# Patient Record
Sex: MALE | Race: WHITE | Employment: UNEMPLOYED | URBAN - METROPOLITAN AREA
[De-identification: names, ages, dates, MRNs, and addresses within clinical notes are randomized per-mention and may not be internally consistent; named-entity substitution may affect disease eponyms.]

---

## 2023-05-12 ENCOUNTER — HOSPITAL ENCOUNTER (INPATIENT)
Age: 63
LOS: 4 days | Discharge: HOME | DRG: 066 | End: 2023-05-16
Attending: STUDENT IN AN ORGANIZED HEALTH CARE EDUCATION/TRAINING PROGRAM | Admitting: FAMILY MEDICINE
Payer: MEDICARE

## 2023-05-12 ENCOUNTER — APPOINTMENT (OUTPATIENT)
Dept: CT IMAGING | Age: 63
DRG: 066 | End: 2023-05-12
Attending: STUDENT IN AN ORGANIZED HEALTH CARE EDUCATION/TRAINING PROGRAM
Payer: MEDICARE

## 2023-05-12 PROBLEM — I60.7: Status: ACTIVE | Noted: 2023-05-12

## 2023-05-12 PROCEDURE — 93005 ELECTROCARDIOGRAM TRACING: CPT | Performed by: STUDENT IN AN ORGANIZED HEALTH CARE EDUCATION/TRAINING PROGRAM

## 2023-05-12 PROCEDURE — 6360000002 HC RX W HCPCS: Performed by: NEUROLOGICAL SURGERY

## 2023-05-12 PROCEDURE — 2000000000 HC ICU R&B

## 2023-05-12 PROCEDURE — 6370000000 HC RX 637 (ALT 250 FOR IP): Performed by: NEUROLOGICAL SURGERY

## 2023-05-12 PROCEDURE — 70450 CT HEAD/BRAIN W/O DYE: CPT

## 2023-05-12 RX ORDER — LEVETIRACETAM 500 MG/1
500 TABLET ORAL 2 TIMES DAILY
Status: DISCONTINUED | OUTPATIENT
Start: 2023-05-12 | End: 2023-05-16 | Stop reason: HOSPADM

## 2023-05-12 RX ORDER — ASPIRIN 81 MG/1
81 TABLET, CHEWABLE ORAL DAILY
COMMUNITY

## 2023-05-12 RX ORDER — ACETAMINOPHEN 325 MG/1
650 TABLET ORAL EVERY 4 HOURS PRN
Status: DISCONTINUED | OUTPATIENT
Start: 2023-05-12 | End: 2023-05-16 | Stop reason: HOSPADM

## 2023-05-12 RX ORDER — ONDANSETRON 4 MG/1
4 TABLET, ORALLY DISINTEGRATING ORAL EVERY 8 HOURS PRN
Status: DISCONTINUED | OUTPATIENT
Start: 2023-05-12 | End: 2023-05-16 | Stop reason: HOSPADM

## 2023-05-12 RX ORDER — AMLODIPINE BESYLATE 5 MG/1
5 TABLET ORAL DAILY
COMMUNITY

## 2023-05-12 RX ORDER — LABETALOL HYDROCHLORIDE 5 MG/ML
10 INJECTION, SOLUTION INTRAVENOUS EVERY 10 MIN PRN
Status: DISCONTINUED | OUTPATIENT
Start: 2023-05-12 | End: 2023-05-16 | Stop reason: HOSPADM

## 2023-05-12 RX ORDER — HYDROCHLOROTHIAZIDE 12.5 MG/1
12.5 TABLET ORAL DAILY
Status: ON HOLD | COMMUNITY
End: 2023-05-16 | Stop reason: HOSPADM

## 2023-05-12 RX ORDER — MAGNESIUM SULFATE 1 G/100ML
1000 INJECTION INTRAVENOUS PRN
Status: DISCONTINUED | OUTPATIENT
Start: 2023-05-12 | End: 2023-05-16 | Stop reason: HOSPADM

## 2023-05-12 RX ORDER — ONDANSETRON 2 MG/ML
4 INJECTION INTRAMUSCULAR; INTRAVENOUS EVERY 6 HOURS PRN
Status: DISCONTINUED | OUTPATIENT
Start: 2023-05-12 | End: 2023-05-16 | Stop reason: HOSPADM

## 2023-05-12 RX ORDER — LOSARTAN POTASSIUM 100 MG/1
100 TABLET ORAL DAILY
Status: ON HOLD | COMMUNITY
End: 2023-05-16 | Stop reason: HOSPADM

## 2023-05-12 RX ORDER — HYDRALAZINE HYDROCHLORIDE 20 MG/ML
10 INJECTION INTRAMUSCULAR; INTRAVENOUS EVERY 10 MIN PRN
Status: DISCONTINUED | OUTPATIENT
Start: 2023-05-12 | End: 2023-05-16 | Stop reason: HOSPADM

## 2023-05-12 RX ADMIN — HYDRALAZINE HYDROCHLORIDE 10 MG: 20 INJECTION INTRAMUSCULAR; INTRAVENOUS at 18:30

## 2023-05-12 RX ADMIN — HYDRALAZINE HYDROCHLORIDE 10 MG: 20 INJECTION INTRAMUSCULAR; INTRAVENOUS at 18:00

## 2023-05-12 RX ADMIN — LEVETIRACETAM 500 MG: 500 TABLET, FILM COATED ORAL at 20:44

## 2023-05-12 ASSESSMENT — PAIN SCALES - GENERAL
PAINLEVEL_OUTOF10: 0
PAINLEVEL_OUTOF10: 2
PAINLEVEL_OUTOF10: 0
PAINLEVEL_OUTOF10: 0
PAINLEVEL_OUTOF10: 2

## 2023-05-12 ASSESSMENT — PAIN - FUNCTIONAL ASSESSMENT: PAIN_FUNCTIONAL_ASSESSMENT: ACTIVITIES ARE NOT PREVENTED

## 2023-05-12 ASSESSMENT — PAIN DESCRIPTION - DESCRIPTORS: DESCRIPTORS: ACHING;DULL;SORE

## 2023-05-12 ASSESSMENT — PAIN DESCRIPTION - PAIN TYPE: TYPE: ACUTE PAIN

## 2023-05-12 ASSESSMENT — PAIN DESCRIPTION - FREQUENCY: FREQUENCY: CONTINUOUS

## 2023-05-12 ASSESSMENT — PAIN DESCRIPTION - ORIENTATION: ORIENTATION: POSTERIOR

## 2023-05-12 ASSESSMENT — PAIN DESCRIPTION - ONSET: ONSET: ON-GOING

## 2023-05-12 ASSESSMENT — PAIN DESCRIPTION - LOCATION: LOCATION: HEAD

## 2023-05-13 ENCOUNTER — APPOINTMENT (OUTPATIENT)
Dept: CT IMAGING | Age: 63
DRG: 066 | End: 2023-05-13
Attending: STUDENT IN AN ORGANIZED HEALTH CARE EDUCATION/TRAINING PROGRAM
Payer: MEDICARE

## 2023-05-13 PROBLEM — I60.9 SUBARACHNOID HEMORRHAGE (HCC): Status: ACTIVE | Noted: 2023-05-12

## 2023-05-13 PROBLEM — I25.810 CORONARY ARTERY DISEASE INVOLVING NONAUTOLOGOUS BIOLOGICAL CORONARY BYPASS GRAFT WITHOUT ANGINA PECTORIS: Status: ACTIVE | Noted: 2023-05-13

## 2023-05-13 PROBLEM — I63.12 CEREBROVASCULAR ACCIDENT (CVA) DUE TO EMBOLISM OF BASILAR ARTERY (HCC): Status: ACTIVE | Noted: 2023-05-13

## 2023-05-13 LAB
ANION GAP SERPL CALCULATED.3IONS-SCNC: 10 MMOL/L (ref 7–16)
BUN SERPL-MCNC: 16 MG/DL (ref 6–23)
CALCIUM SERPL-MCNC: 8.6 MG/DL (ref 8.6–10.2)
CHLORIDE SERPL-SCNC: 104 MMOL/L (ref 98–107)
CO2 SERPL-SCNC: 23 MMOL/L (ref 22–29)
CREAT SERPL-MCNC: 1 MG/DL (ref 0.7–1.2)
ERYTHROCYTE [DISTWIDTH] IN BLOOD BY AUTOMATED COUNT: 11.9 FL (ref 11.5–15)
GLUCOSE SERPL-MCNC: 96 MG/DL (ref 74–99)
HBA1C MFR BLD: 5.5 % (ref 4–5.6)
HCT VFR BLD AUTO: 40.5 % (ref 37–54)
HGB BLD-MCNC: 14 G/DL (ref 12.5–16.5)
MAGNESIUM SERPL-MCNC: 2.2 MG/DL (ref 1.6–2.6)
MCH RBC QN AUTO: 30 PG (ref 26–35)
MCHC RBC AUTO-ENTMCNC: 34.6 % (ref 32–34.5)
MCV RBC AUTO: 86.7 FL (ref 80–99.9)
PLATELET # BLD AUTO: 227 E9/L (ref 130–450)
PMV BLD AUTO: 10.4 FL (ref 7–12)
POTASSIUM SERPL-SCNC: 3.9 MMOL/L (ref 3.5–5)
RBC # BLD AUTO: 4.67 E12/L (ref 3.8–5.8)
SODIUM SERPL-SCNC: 137 MMOL/L (ref 132–146)
T4 FREE SERPL-MCNC: 1.49 NG/DL (ref 0.93–1.7)
TSH SERPL-MCNC: 0.43 UIU/ML (ref 0.27–4.2)
WBC # BLD: 5.8 E9/L (ref 4.5–11.5)

## 2023-05-13 PROCEDURE — 83735 ASSAY OF MAGNESIUM: CPT

## 2023-05-13 PROCEDURE — 84439 ASSAY OF FREE THYROXINE: CPT

## 2023-05-13 PROCEDURE — APPSS60 APP SPLIT SHARED TIME 46-60 MINUTES: Performed by: PHYSICIAN ASSISTANT

## 2023-05-13 PROCEDURE — 70450 CT HEAD/BRAIN W/O DYE: CPT

## 2023-05-13 PROCEDURE — 94640 AIRWAY INHALATION TREATMENT: CPT

## 2023-05-13 PROCEDURE — 94664 DEMO&/EVAL PT USE INHALER: CPT

## 2023-05-13 PROCEDURE — 83036 HEMOGLOBIN GLYCOSYLATED A1C: CPT

## 2023-05-13 PROCEDURE — 84443 ASSAY THYROID STIM HORMONE: CPT

## 2023-05-13 PROCEDURE — 85027 COMPLETE CBC AUTOMATED: CPT

## 2023-05-13 PROCEDURE — 6370000000 HC RX 637 (ALT 250 FOR IP): Performed by: INTERNAL MEDICINE

## 2023-05-13 PROCEDURE — 6370000000 HC RX 637 (ALT 250 FOR IP): Performed by: NEUROLOGICAL SURGERY

## 2023-05-13 PROCEDURE — 99255 IP/OBS CONSLTJ NEW/EST HI 80: CPT | Performed by: INTERNAL MEDICINE

## 2023-05-13 PROCEDURE — 2000000000 HC ICU R&B

## 2023-05-13 PROCEDURE — 6370000000 HC RX 637 (ALT 250 FOR IP): Performed by: FAMILY MEDICINE

## 2023-05-13 PROCEDURE — 80048 BASIC METABOLIC PNL TOTAL CA: CPT

## 2023-05-13 PROCEDURE — 6360000002 HC RX W HCPCS: Performed by: FAMILY MEDICINE

## 2023-05-13 RX ORDER — AMLODIPINE BESYLATE 5 MG/1
5 TABLET ORAL DAILY
Status: DISCONTINUED | OUTPATIENT
Start: 2023-05-13 | End: 2023-05-16 | Stop reason: HOSPADM

## 2023-05-13 RX ORDER — LOSARTAN POTASSIUM 50 MG/1
50 TABLET ORAL DAILY
Status: DISCONTINUED | OUTPATIENT
Start: 2023-05-13 | End: 2023-05-16 | Stop reason: HOSPADM

## 2023-05-13 RX ORDER — ARFORMOTEROL TARTRATE 15 UG/2ML
15 SOLUTION RESPIRATORY (INHALATION) 2 TIMES DAILY
Status: DISCONTINUED | OUTPATIENT
Start: 2023-05-13 | End: 2023-05-16 | Stop reason: HOSPADM

## 2023-05-13 RX ORDER — ATORVASTATIN CALCIUM 40 MG/1
40 TABLET, FILM COATED ORAL NIGHTLY
Status: DISCONTINUED | OUTPATIENT
Start: 2023-05-13 | End: 2023-05-16 | Stop reason: HOSPADM

## 2023-05-13 RX ADMIN — IPRATROPIUM BROMIDE 0.5 MG: 0.5 SOLUTION RESPIRATORY (INHALATION) at 21:17

## 2023-05-13 RX ADMIN — LEVETIRACETAM 500 MG: 500 TABLET, FILM COATED ORAL at 08:14

## 2023-05-13 RX ADMIN — ARFORMOTEROL TARTRATE 15 MCG: 15 SOLUTION RESPIRATORY (INHALATION) at 21:17

## 2023-05-13 RX ADMIN — IPRATROPIUM BROMIDE 0.5 MG: 0.5 SOLUTION RESPIRATORY (INHALATION) at 15:34

## 2023-05-13 RX ADMIN — AMLODIPINE BESYLATE 5 MG: 5 TABLET ORAL at 12:18

## 2023-05-13 RX ADMIN — LEVETIRACETAM 500 MG: 500 TABLET, FILM COATED ORAL at 20:30

## 2023-05-13 RX ADMIN — ATORVASTATIN CALCIUM 40 MG: 40 TABLET, FILM COATED ORAL at 19:41

## 2023-05-13 RX ADMIN — LOSARTAN POTASSIUM 50 MG: 50 TABLET, FILM COATED ORAL at 12:18

## 2023-05-13 ASSESSMENT — PAIN SCALES - GENERAL
PAINLEVEL_OUTOF10: 0

## 2023-05-14 ENCOUNTER — APPOINTMENT (OUTPATIENT)
Dept: MRI IMAGING | Age: 63
DRG: 066 | End: 2023-05-14
Attending: STUDENT IN AN ORGANIZED HEALTH CARE EDUCATION/TRAINING PROGRAM
Payer: MEDICARE

## 2023-05-14 LAB
ALBUMIN SERPL-MCNC: 3.5 G/DL (ref 3.5–5.2)
ALP SERPL-CCNC: 88 U/L (ref 40–129)
ALT SERPL-CCNC: 12 U/L (ref 0–40)
ANION GAP SERPL CALCULATED.3IONS-SCNC: 9 MMOL/L (ref 7–16)
AST SERPL-CCNC: 14 U/L (ref 0–39)
BASOPHILS # BLD: 0.03 E9/L (ref 0–0.2)
BASOPHILS NFR BLD: 0.6 % (ref 0–2)
BILIRUB SERPL-MCNC: 0.3 MG/DL (ref 0–1.2)
BUN SERPL-MCNC: 17 MG/DL (ref 6–23)
CA-I BLD-SCNC: 1.26 MMOL/L (ref 1.15–1.33)
CALCIUM SERPL-MCNC: 8.7 MG/DL (ref 8.6–10.2)
CHLORIDE SERPL-SCNC: 106 MMOL/L (ref 98–107)
CHOLESTEROL, TOTAL: 160 MG/DL (ref 0–199)
CO2 SERPL-SCNC: 23 MMOL/L (ref 22–29)
CREAT SERPL-MCNC: 1.2 MG/DL (ref 0.7–1.2)
EOSINOPHIL # BLD: 0.13 E9/L (ref 0.05–0.5)
EOSINOPHIL NFR BLD: 2.7 % (ref 0–6)
ERYTHROCYTE [DISTWIDTH] IN BLOOD BY AUTOMATED COUNT: 11.9 FL (ref 11.5–15)
GLUCOSE SERPL-MCNC: 96 MG/DL (ref 74–99)
HBA1C MFR BLD: 5.4 % (ref 4–5.6)
HCT VFR BLD AUTO: 37.9 % (ref 37–54)
HDLC SERPL-MCNC: 38 MG/DL
HGB BLD-MCNC: 13.2 G/DL (ref 12.5–16.5)
IMM GRANULOCYTES # BLD: 0.01 E9/L
IMM GRANULOCYTES NFR BLD: 0.2 % (ref 0–5)
LDLC SERPL CALC-MCNC: 103 MG/DL (ref 0–99)
LYMPHOCYTES # BLD: 1.02 E9/L (ref 1.5–4)
LYMPHOCYTES NFR BLD: 21 % (ref 20–42)
MAGNESIUM SERPL-MCNC: 2.1 MG/DL (ref 1.6–2.6)
MCH RBC QN AUTO: 30.3 PG (ref 26–35)
MCHC RBC AUTO-ENTMCNC: 34.8 % (ref 32–34.5)
MCV RBC AUTO: 87.1 FL (ref 80–99.9)
MONOCYTES # BLD: 0.44 E9/L (ref 0.1–0.95)
MONOCYTES NFR BLD: 9.1 % (ref 2–12)
NEUTROPHILS # BLD: 3.23 E9/L (ref 1.8–7.3)
NEUTS SEG NFR BLD: 66.4 % (ref 43–80)
PHOSPHATE SERPL-MCNC: 3.4 MG/DL (ref 2.5–4.5)
PLATELET # BLD AUTO: 214 E9/L (ref 130–450)
PMV BLD AUTO: 10.7 FL (ref 7–12)
POTASSIUM SERPL-SCNC: 3.9 MMOL/L (ref 3.5–5)
PROT SERPL-MCNC: 6.1 G/DL (ref 6.4–8.3)
RBC # BLD AUTO: 4.35 E12/L (ref 3.8–5.8)
SODIUM SERPL-SCNC: 138 MMOL/L (ref 132–146)
TRIGL SERPL-MCNC: 93 MG/DL (ref 0–149)
VLDLC SERPL CALC-MCNC: 19 MG/DL
WBC # BLD: 4.9 E9/L (ref 4.5–11.5)

## 2023-05-14 PROCEDURE — 94640 AIRWAY INHALATION TREATMENT: CPT

## 2023-05-14 PROCEDURE — 84100 ASSAY OF PHOSPHORUS: CPT

## 2023-05-14 PROCEDURE — 6370000000 HC RX 637 (ALT 250 FOR IP): Performed by: NEUROLOGICAL SURGERY

## 2023-05-14 PROCEDURE — 80053 COMPREHEN METABOLIC PANEL: CPT

## 2023-05-14 PROCEDURE — 6370000000 HC RX 637 (ALT 250 FOR IP): Performed by: NURSE PRACTITIONER

## 2023-05-14 PROCEDURE — 83036 HEMOGLOBIN GLYCOSYLATED A1C: CPT

## 2023-05-14 PROCEDURE — 6370000000 HC RX 637 (ALT 250 FOR IP): Performed by: FAMILY MEDICINE

## 2023-05-14 PROCEDURE — 85025 COMPLETE CBC W/AUTO DIFF WBC: CPT

## 2023-05-14 PROCEDURE — 70551 MRI BRAIN STEM W/O DYE: CPT

## 2023-05-14 PROCEDURE — 6360000002 HC RX W HCPCS: Performed by: FAMILY MEDICINE

## 2023-05-14 PROCEDURE — 2000000000 HC ICU R&B

## 2023-05-14 PROCEDURE — 80061 LIPID PANEL: CPT

## 2023-05-14 PROCEDURE — 83735 ASSAY OF MAGNESIUM: CPT

## 2023-05-14 PROCEDURE — 82330 ASSAY OF CALCIUM: CPT

## 2023-05-14 PROCEDURE — 6370000000 HC RX 637 (ALT 250 FOR IP): Performed by: INTERNAL MEDICINE

## 2023-05-14 RX ORDER — ASPIRIN 81 MG/1
81 TABLET ORAL DAILY
Status: DISCONTINUED | OUTPATIENT
Start: 2023-05-14 | End: 2023-05-16 | Stop reason: HOSPADM

## 2023-05-14 RX ADMIN — LOSARTAN POTASSIUM 50 MG: 50 TABLET, FILM COATED ORAL at 08:00

## 2023-05-14 RX ADMIN — ASPIRIN 81 MG: 81 TABLET, COATED ORAL at 08:44

## 2023-05-14 RX ADMIN — AMLODIPINE BESYLATE 5 MG: 5 TABLET ORAL at 08:00

## 2023-05-14 RX ADMIN — LEVETIRACETAM 500 MG: 500 TABLET, FILM COATED ORAL at 20:22

## 2023-05-14 RX ADMIN — ARFORMOTEROL TARTRATE 15 MCG: 15 SOLUTION RESPIRATORY (INHALATION) at 20:42

## 2023-05-14 RX ADMIN — TICAGRELOR 90 MG: 90 TABLET ORAL at 08:44

## 2023-05-14 RX ADMIN — TICAGRELOR 90 MG: 90 TABLET ORAL at 20:22

## 2023-05-14 RX ADMIN — LEVETIRACETAM 500 MG: 500 TABLET, FILM COATED ORAL at 08:00

## 2023-05-14 RX ADMIN — ARFORMOTEROL TARTRATE 15 MCG: 15 SOLUTION RESPIRATORY (INHALATION) at 09:07

## 2023-05-14 RX ADMIN — IPRATROPIUM BROMIDE 0.5 MG: 0.5 SOLUTION RESPIRATORY (INHALATION) at 12:03

## 2023-05-14 RX ADMIN — IPRATROPIUM BROMIDE 0.5 MG: 0.5 SOLUTION RESPIRATORY (INHALATION) at 20:42

## 2023-05-14 RX ADMIN — IPRATROPIUM BROMIDE 0.5 MG: 0.5 SOLUTION RESPIRATORY (INHALATION) at 09:07

## 2023-05-14 RX ADMIN — ATORVASTATIN CALCIUM 40 MG: 40 TABLET, FILM COATED ORAL at 20:22

## 2023-05-14 RX ADMIN — IPRATROPIUM BROMIDE 0.5 MG: 0.5 SOLUTION RESPIRATORY (INHALATION) at 15:46

## 2023-05-14 ASSESSMENT — PAIN SCALES - GENERAL
PAINLEVEL_OUTOF10: 0

## 2023-05-15 ENCOUNTER — APPOINTMENT (OUTPATIENT)
Dept: CT IMAGING | Age: 63
DRG: 066 | End: 2023-05-15
Attending: STUDENT IN AN ORGANIZED HEALTH CARE EDUCATION/TRAINING PROGRAM
Payer: MEDICARE

## 2023-05-15 LAB
ANION GAP SERPL CALCULATED.3IONS-SCNC: 7 MMOL/L (ref 7–16)
BASOPHILS # BLD: 0.04 E9/L (ref 0–0.2)
BASOPHILS NFR BLD: 0.9 % (ref 0–2)
BUN SERPL-MCNC: 14 MG/DL (ref 6–23)
CA-I BLD-SCNC: 1.25 MMOL/L (ref 1.15–1.33)
CALCIUM SERPL-MCNC: 8.6 MG/DL (ref 8.6–10.2)
CHLORIDE SERPL-SCNC: 106 MMOL/L (ref 98–107)
CO2 SERPL-SCNC: 24 MMOL/L (ref 22–29)
CREAT SERPL-MCNC: 1.1 MG/DL (ref 0.7–1.2)
EKG ATRIAL RATE: 57 BPM
EKG P AXIS: 62 DEGREES
EKG P-R INTERVAL: 214 MS
EKG Q-T INTERVAL: 412 MS
EKG QRS DURATION: 80 MS
EKG QTC CALCULATION (BAZETT): 401 MS
EKG R AXIS: 13 DEGREES
EKG T AXIS: 36 DEGREES
EKG VENTRICULAR RATE: 57 BPM
EOSINOPHIL # BLD: 0.15 E9/L (ref 0.05–0.5)
EOSINOPHIL NFR BLD: 3.3 % (ref 0–6)
ERYTHROCYTE [DISTWIDTH] IN BLOOD BY AUTOMATED COUNT: 12.1 FL (ref 11.5–15)
GLUCOSE SERPL-MCNC: 104 MG/DL (ref 74–99)
HCT VFR BLD AUTO: 38.1 % (ref 37–54)
HGB BLD-MCNC: 12.8 G/DL (ref 12.5–16.5)
IMM GRANULOCYTES # BLD: 0.02 E9/L
IMM GRANULOCYTES NFR BLD: 0.4 % (ref 0–5)
LYMPHOCYTES # BLD: 0.86 E9/L (ref 1.5–4)
LYMPHOCYTES NFR BLD: 18.7 % (ref 20–42)
MAGNESIUM SERPL-MCNC: 2.2 MG/DL (ref 1.6–2.6)
MCH RBC QN AUTO: 30 PG (ref 26–35)
MCHC RBC AUTO-ENTMCNC: 33.6 % (ref 32–34.5)
MCV RBC AUTO: 89.2 FL (ref 80–99.9)
MONOCYTES # BLD: 0.36 E9/L (ref 0.1–0.95)
MONOCYTES NFR BLD: 7.8 % (ref 2–12)
NEUTROPHILS # BLD: 3.17 E9/L (ref 1.8–7.3)
NEUTS SEG NFR BLD: 68.9 % (ref 43–80)
PHOSPHATE SERPL-MCNC: 3.8 MG/DL (ref 2.5–4.5)
PLATELET # BLD AUTO: 213 E9/L (ref 130–450)
PMV BLD AUTO: 10.6 FL (ref 7–12)
POTASSIUM SERPL-SCNC: 3.8 MMOL/L (ref 3.5–5)
RBC # BLD AUTO: 4.27 E12/L (ref 3.8–5.8)
SODIUM SERPL-SCNC: 137 MMOL/L (ref 132–146)
WBC # BLD: 4.6 E9/L (ref 4.5–11.5)

## 2023-05-15 PROCEDURE — 6370000000 HC RX 637 (ALT 250 FOR IP): Performed by: NURSE PRACTITIONER

## 2023-05-15 PROCEDURE — 2060000000 HC ICU INTERMEDIATE R&B

## 2023-05-15 PROCEDURE — 80048 BASIC METABOLIC PNL TOTAL CA: CPT

## 2023-05-15 PROCEDURE — 6370000000 HC RX 637 (ALT 250 FOR IP): Performed by: NEUROLOGICAL SURGERY

## 2023-05-15 PROCEDURE — 84100 ASSAY OF PHOSPHORUS: CPT

## 2023-05-15 PROCEDURE — 6370000000 HC RX 637 (ALT 250 FOR IP): Performed by: FAMILY MEDICINE

## 2023-05-15 PROCEDURE — 82330 ASSAY OF CALCIUM: CPT

## 2023-05-15 PROCEDURE — 6360000002 HC RX W HCPCS: Performed by: FAMILY MEDICINE

## 2023-05-15 PROCEDURE — 83735 ASSAY OF MAGNESIUM: CPT

## 2023-05-15 PROCEDURE — 97530 THERAPEUTIC ACTIVITIES: CPT

## 2023-05-15 PROCEDURE — 36415 COLL VENOUS BLD VENIPUNCTURE: CPT

## 2023-05-15 PROCEDURE — 97165 OT EVAL LOW COMPLEX 30 MIN: CPT

## 2023-05-15 PROCEDURE — 92523 SPEECH SOUND LANG COMPREHEN: CPT | Performed by: SPEECH-LANGUAGE PATHOLOGIST

## 2023-05-15 PROCEDURE — 85025 COMPLETE CBC W/AUTO DIFF WBC: CPT

## 2023-05-15 PROCEDURE — 93010 ELECTROCARDIOGRAM REPORT: CPT | Performed by: INTERNAL MEDICINE

## 2023-05-15 PROCEDURE — 70450 CT HEAD/BRAIN W/O DYE: CPT

## 2023-05-15 PROCEDURE — 94640 AIRWAY INHALATION TREATMENT: CPT

## 2023-05-15 PROCEDURE — 6370000000 HC RX 637 (ALT 250 FOR IP): Performed by: INTERNAL MEDICINE

## 2023-05-15 PROCEDURE — 97161 PT EVAL LOW COMPLEX 20 MIN: CPT

## 2023-05-15 RX ORDER — HYDRALAZINE HYDROCHLORIDE 20 MG/ML
10 INJECTION INTRAMUSCULAR; INTRAVENOUS EVERY 4 HOURS PRN
Status: CANCELLED | OUTPATIENT
Start: 2023-05-15

## 2023-05-15 RX ORDER — POLYETHYLENE GLYCOL 3350 17 G/17G
17 POWDER, FOR SOLUTION ORAL DAILY
Status: DISCONTINUED | OUTPATIENT
Start: 2023-05-15 | End: 2023-05-16 | Stop reason: HOSPADM

## 2023-05-15 RX ORDER — LABETALOL HYDROCHLORIDE 5 MG/ML
10 INJECTION, SOLUTION INTRAVENOUS EVERY 4 HOURS PRN
Status: CANCELLED | OUTPATIENT
Start: 2023-05-15

## 2023-05-15 RX ORDER — SENNA PLUS 8.6 MG/1
1 TABLET ORAL NIGHTLY
Status: DISCONTINUED | OUTPATIENT
Start: 2023-05-15 | End: 2023-05-16 | Stop reason: HOSPADM

## 2023-05-15 RX ADMIN — ASPIRIN 81 MG: 81 TABLET, COATED ORAL at 08:27

## 2023-05-15 RX ADMIN — IPRATROPIUM BROMIDE 0.5 MG: 0.5 SOLUTION RESPIRATORY (INHALATION) at 16:55

## 2023-05-15 RX ADMIN — IPRATROPIUM BROMIDE 0.5 MG: 0.5 SOLUTION RESPIRATORY (INHALATION) at 08:36

## 2023-05-15 RX ADMIN — ATORVASTATIN CALCIUM 40 MG: 40 TABLET, FILM COATED ORAL at 20:15

## 2023-05-15 RX ADMIN — AMLODIPINE BESYLATE 5 MG: 5 TABLET ORAL at 08:27

## 2023-05-15 RX ADMIN — ARFORMOTEROL TARTRATE 15 MCG: 15 SOLUTION RESPIRATORY (INHALATION) at 08:36

## 2023-05-15 RX ADMIN — TICAGRELOR 90 MG: 90 TABLET ORAL at 08:27

## 2023-05-15 RX ADMIN — TICAGRELOR 90 MG: 90 TABLET ORAL at 20:15

## 2023-05-15 RX ADMIN — LOSARTAN POTASSIUM 50 MG: 50 TABLET, FILM COATED ORAL at 08:27

## 2023-05-15 RX ADMIN — POLYETHYLENE GLYCOL 3350 17 G: 17 POWDER, FOR SOLUTION ORAL at 10:32

## 2023-05-15 RX ADMIN — LEVETIRACETAM 500 MG: 500 TABLET, FILM COATED ORAL at 08:27

## 2023-05-15 RX ADMIN — LEVETIRACETAM 500 MG: 500 TABLET, FILM COATED ORAL at 20:15

## 2023-05-15 RX ADMIN — IPRATROPIUM BROMIDE 0.5 MG: 0.5 SOLUTION RESPIRATORY (INHALATION) at 12:01

## 2023-05-15 RX ADMIN — SENNOSIDES 8.6 MG: 8.6 TABLET, FILM COATED ORAL at 20:15

## 2023-05-15 ASSESSMENT — PAIN SCALES - GENERAL
PAINLEVEL_OUTOF10: 0

## 2023-05-15 NOTE — PLAN OF CARE
Problem: Discharge Planning  Goal: Discharge to home or other facility with appropriate resources  5/14/2023 2050 by Latasha Carrasco RN  Outcome: Progressing  5/14/2023 0855 by Kevin Polk RN  Outcome: Progressing     Problem: Pain  Goal: Verbalizes/displays adequate comfort level or baseline comfort level  5/14/2023 2050 by Latasha Carrasco RN  Outcome: Progressing  5/14/2023 0855 by Kevin Polk RN  Outcome: Progressing  Flowsheets  Taken 5/14/2023 0700 by Mariaelena Reynaga RN  Verbalizes/displays adequate comfort level or baseline comfort level:   Encourage patient to monitor pain and request assistance   Assess pain using appropriate pain scale   Administer analgesics based on type and severity of pain and evaluate response   Implement non-pharmacological measures as appropriate and evaluate response  Taken 5/14/2023 0600 by Mariaelena Reynaga RN  Verbalizes/displays adequate comfort level or baseline comfort level:   Encourage patient to monitor pain and request assistance   Assess pain using appropriate pain scale   Administer analgesics based on type and severity of pain and evaluate response   Implement non-pharmacological measures as appropriate and evaluate response  Taken 5/14/2023 0500 by Mariaelena Reynaga RN  Verbalizes/displays adequate comfort level or baseline comfort level:   Assess pain using appropriate pain scale   Administer analgesics based on type and severity of pain and evaluate response   Implement non-pharmacological measures as appropriate and evaluate response  Taken 5/14/2023 0400 by Mariaelena Reynaga RN  Verbalizes/displays adequate comfort level or baseline comfort level:   Encourage patient to monitor pain and request assistance   Assess pain using appropriate pain scale   Administer analgesics based on type and severity of pain and evaluate response   Implement non-pharmacological measures as appropriate and evaluate response  Taken 5/14/2023 0300 by Mariaelena Reynaga

## 2023-05-15 NOTE — CARE COORDINATION
5/15 Care Coordination:Pt presented from [facility] due to concern for subarachnoid hemorrhage. Patient is status post heart cath   with PCI and placement of stents x2. Thus was transferred here. MRI of the brain positive for CVA involving punctate acute infarcts involving the left dorsal leesa and punctate acute infarct also seen in the right cerebellum and left occipital lobe no mass effect or midline shift seen. PT and OT consulted. PTA. No Hx of FABBY or HHC. Indp lives with his wife, Pt still works. Plan is discharge home. No needs at this time. Possible OP Therapy. CM/SW will continue to follow for discharge planning.    Keyana ARREGUIN,RN-CV-BC  [phone]

## 2023-05-15 NOTE — PLAN OF CARE
Problem: Discharge Planning  Goal: Discharge to home or other facility with appropriate resources  5/15/2023 0758 by Ratna Nieto RN  Outcome: Progressing  Flowsheets (Taken 5/15/2023 6369)  Discharge to home or other facility with appropriate resources:   Identify barriers to discharge with patient and caregiver   Arrange for needed discharge resources and transportation as appropriate   Identify discharge learning needs (meds, wound care, etc)     Problem: Pain  Goal: Verbalizes/displays adequate comfort level or baseline comfort level  5/15/2023 0758 by Ratna Nieto RN  Outcome: Progressing  Flowsheets (Taken 5/15/2023 0758)  Verbalizes/displays adequate comfort level or baseline comfort level:   Encourage patient to monitor pain and request assistance   Assess pain using appropriate pain scale     Problem: Neurosensory - Adult  Goal: Achieves stable or improved neurological status  5/15/2023 0758 by Ratna Nieto RN  Outcome: Progressing  Flowsheets (Taken 5/15/2023 0758)  Achieves stable or improved neurological status:   Assess for and report changes in neurological status   Initiate measures to prevent increased intracranial pressure   Maintain blood pressure and fluid volume within ordered parameters to optimize cerebral perfusion and minimize risk of hemorrhage   Monitor temperature, glucose, and sodium. Initiate appropriate interventions as ordered  Note: No complaints of vision disturbances this morning.       Problem: Neurosensory - Adult  Goal: Achieves maximal functionality and self care  5/15/2023 0758 by Ratna Nieto RN  Outcome: Progressing  Flowsheets (Taken 5/15/2023 0758)  Achieves maximal functionality and self care:   Monitor swallowing and airway patency with patient fatigue and changes in neurological status   Encourage visually impaired, hearing impaired and aphasic patients to use assistive/communication devices   Encourage and assist patient to increase activity and

## 2023-05-16 VITALS
HEIGHT: 66 IN | OXYGEN SATURATION: 95 % | SYSTOLIC BLOOD PRESSURE: 104 MMHG | DIASTOLIC BLOOD PRESSURE: 57 MMHG | BODY MASS INDEX: 29.09 KG/M2 | TEMPERATURE: 97.1 F | RESPIRATION RATE: 16 BRPM | WEIGHT: 181 LBS | HEART RATE: 63 BPM

## 2023-05-16 LAB
ANION GAP SERPL CALCULATED.3IONS-SCNC: 8 MMOL/L (ref 7–16)
BASOPHILS # BLD: 0.03 E9/L (ref 0–0.2)
BASOPHILS NFR BLD: 0.7 % (ref 0–2)
BUN SERPL-MCNC: 18 MG/DL (ref 6–23)
CA-I BLD-SCNC: 1.22 MMOL/L (ref 1.15–1.33)
CALCIUM SERPL-MCNC: 8.7 MG/DL (ref 8.6–10.2)
CHLORIDE SERPL-SCNC: 104 MMOL/L (ref 98–107)
CO2 SERPL-SCNC: 23 MMOL/L (ref 22–29)
CREAT SERPL-MCNC: 1.3 MG/DL (ref 0.7–1.2)
EOSINOPHIL # BLD: 0.11 E9/L (ref 0.05–0.5)
EOSINOPHIL NFR BLD: 2.5 % (ref 0–6)
ERYTHROCYTE [DISTWIDTH] IN BLOOD BY AUTOMATED COUNT: 12.2 FL (ref 11.5–15)
GLUCOSE SERPL-MCNC: 124 MG/DL (ref 74–99)
HCT VFR BLD AUTO: 36.4 % (ref 37–54)
HGB BLD-MCNC: 12.3 G/DL (ref 12.5–16.5)
IMM GRANULOCYTES # BLD: 0.01 E9/L
IMM GRANULOCYTES NFR BLD: 0.2 % (ref 0–5)
LYMPHOCYTES # BLD: 0.76 E9/L (ref 1.5–4)
LYMPHOCYTES NFR BLD: 17.4 % (ref 20–42)
MAGNESIUM SERPL-MCNC: 2.1 MG/DL (ref 1.6–2.6)
MCH RBC QN AUTO: 29.9 PG (ref 26–35)
MCHC RBC AUTO-ENTMCNC: 33.8 % (ref 32–34.5)
MCV RBC AUTO: 88.6 FL (ref 80–99.9)
MONOCYTES # BLD: 0.33 E9/L (ref 0.1–0.95)
MONOCYTES NFR BLD: 7.6 % (ref 2–12)
NEUTROPHILS # BLD: 3.12 E9/L (ref 1.8–7.3)
NEUTS SEG NFR BLD: 71.6 % (ref 43–80)
PHOSPHATE SERPL-MCNC: 3.5 MG/DL (ref 2.5–4.5)
PLATELET # BLD AUTO: 223 E9/L (ref 130–450)
PMV BLD AUTO: 10.4 FL (ref 7–12)
POTASSIUM SERPL-SCNC: 4.4 MMOL/L (ref 3.5–5)
RBC # BLD AUTO: 4.11 E12/L (ref 3.8–5.8)
SODIUM SERPL-SCNC: 135 MMOL/L (ref 132–146)
WBC # BLD: 4.4 E9/L (ref 4.5–11.5)

## 2023-05-16 PROCEDURE — 80048 BASIC METABOLIC PNL TOTAL CA: CPT

## 2023-05-16 PROCEDURE — 6370000000 HC RX 637 (ALT 250 FOR IP): Performed by: NURSE PRACTITIONER

## 2023-05-16 PROCEDURE — 85025 COMPLETE CBC W/AUTO DIFF WBC: CPT

## 2023-05-16 PROCEDURE — 84100 ASSAY OF PHOSPHORUS: CPT

## 2023-05-16 PROCEDURE — 36415 COLL VENOUS BLD VENIPUNCTURE: CPT

## 2023-05-16 PROCEDURE — 82330 ASSAY OF CALCIUM: CPT

## 2023-05-16 PROCEDURE — 6370000000 HC RX 637 (ALT 250 FOR IP): Performed by: NEUROLOGICAL SURGERY

## 2023-05-16 PROCEDURE — 6360000002 HC RX W HCPCS: Performed by: FAMILY MEDICINE

## 2023-05-16 PROCEDURE — 94640 AIRWAY INHALATION TREATMENT: CPT

## 2023-05-16 PROCEDURE — 83735 ASSAY OF MAGNESIUM: CPT

## 2023-05-16 PROCEDURE — 6370000000 HC RX 637 (ALT 250 FOR IP): Performed by: FAMILY MEDICINE

## 2023-05-16 RX ORDER — ATORVASTATIN CALCIUM 40 MG/1
40 TABLET, FILM COATED ORAL NIGHTLY
Qty: 30 TABLET | Refills: 0 | Status: SHIPPED | OUTPATIENT
Start: 2023-05-16

## 2023-05-16 RX ORDER — LOSARTAN POTASSIUM 50 MG/1
50 TABLET ORAL DAILY
Qty: 30 TABLET | Refills: 0 | Status: SHIPPED | OUTPATIENT
Start: 2023-05-17

## 2023-05-16 RX ADMIN — LOSARTAN POTASSIUM 50 MG: 50 TABLET, FILM COATED ORAL at 08:13

## 2023-05-16 RX ADMIN — ARFORMOTEROL TARTRATE 15 MCG: 15 SOLUTION RESPIRATORY (INHALATION) at 09:21

## 2023-05-16 RX ADMIN — ASPIRIN 81 MG: 81 TABLET, COATED ORAL at 08:12

## 2023-05-16 RX ADMIN — LEVETIRACETAM 500 MG: 500 TABLET, FILM COATED ORAL at 08:13

## 2023-05-16 RX ADMIN — AMLODIPINE BESYLATE 5 MG: 5 TABLET ORAL at 08:12

## 2023-05-16 RX ADMIN — TICAGRELOR 90 MG: 90 TABLET ORAL at 08:13

## 2023-05-16 RX ADMIN — IPRATROPIUM BROMIDE 0.5 MG: 0.5 SOLUTION RESPIRATORY (INHALATION) at 09:21

## 2023-05-16 RX ADMIN — IPRATROPIUM BROMIDE 0.5 MG: 0.5 SOLUTION RESPIRATORY (INHALATION) at 01:10

## 2023-05-16 NOTE — DISCHARGE SUMMARY
or nasal polyp in the inferior right maxillary sinus. SOFT TISSUES/SKULL:  No acute abnormality of the visualized skull or soft tissues. Decreased density of left subarachnoid hemorrhage, no new intracranial hemorrhage identified. CT HEAD WO CONTRAST    Result Date: 5/12/2023  EXAMINATION: CT OF THE HEAD WITHOUT CONTRAST  5/12/2023 5:12 pm TECHNIQUE: CT of the head was performed without the administration of intravenous contrast. Automated exposure control, iterative reconstruction, and/or weight based adjustment of the mA/kV was utilized to reduce the radiation dose to as low as reasonably achievable. COMPARISON: Outside facility CT head without 05/12/2023 at 14:00 HISTORY: ORDERING SYSTEM PROVIDED HISTORY: [facility] TECHNOLOGIST PROVIDED HISTORY: Reason for exam:->SAH Has a \"code stroke\" or \"stroke alert\" been called? ->No What reading provider will be dictating this exam?->CRC FINDINGS: BRAIN/VENTRICLES: Compared to the prior outside facility exam proximally 5 hours earlier, there is stable small amount of subarachnoid hemorrhage centered in the left occipital lobe with some involvement of the temporal lobe. No new intracranial hemorrhages are identified. No significant mass effect or midline shift is seen. The gray-white differentiation is maintained without evidence of an acute infarct. There is prominence of the ventricles and sulci due to global parenchymal volume loss. There are nonspecific areas of hypoattenuation within the periventricular and subcortical white matter, which likely represent chronic microvascular ischemic change. ORBITS: The visualized portion of the orbits demonstrate no acute abnormality. SINUSES: The visualized paranasal sinuses and mastoid air cells demonstrate no acute abnormality. SOFT TISSUES/SKULL: No acute abnormality of the visualized skull or soft tissues. Stable left occipital and temporal subarachnoid hemorrhage. No significant mass effect or midline shift.      MRI

## 2023-05-16 NOTE — PROGRESS NOTES
AVS and D/C instructions reviewed with patient and family. No further questions at this time.
Department of Neurosurgery  Progress Note    CHIEF COMPLAINT: SAH    SUBJECTIVE:  No acute events overnight. No complaints. REVIEW OF SYSTEMS :  Constitutional: Negative for chills and fever. Neurological: Negative for dizziness, tremors and speech change.      OBJECTIVE:   VITALS:  BP 99/72   Pulse 69   Temp 97.6 °F (36.4 °C) (Oral)   Resp 16   Ht 5' 6\" (1.676 m)   Wt 181 lb (82.1 kg)   SpO2 94%   BMI 29.21 kg/m²     PHYSICAL:  Alert, oriented  Appears stated age  PERRL  EOMI  Strength full  Sensation intact to light touch  (-)drift    DATA:  CBC:   Lab Results   Component Value Date/Time    WBC 4.6 05/15/2023 05:10 AM    RBC 4.27 05/15/2023 05:10 AM    HGB 12.8 05/15/2023 05:10 AM    HCT 38.1 05/15/2023 05:10 AM    MCV 89.2 05/15/2023 05:10 AM    MCH 30.0 05/15/2023 05:10 AM    MCHC 33.6 05/15/2023 05:10 AM    RDW 12.1 05/15/2023 05:10 AM     05/15/2023 05:10 AM    MPV 10.6 05/15/2023 05:10 AM     BMP:    Lab Results   Component Value Date/Time     05/15/2023 05:10 AM    K 3.8 05/15/2023 05:10 AM     05/15/2023 05:10 AM    CO2 24 05/15/2023 05:10 AM    BUN 14 05/15/2023 05:10 AM    LABALBU 3.5 05/14/2023 04:10 AM    CREATININE 1.1 05/15/2023 05:10 AM    CALCIUM 8.6 05/15/2023 05:10 AM    LABGLOM >60 05/15/2023 05:10 AM    GLUCOSE 104 05/15/2023 05:10 AM     PT/INR:  No results found for: PROTIME, INR  PTT:  No results found for: APTT, PTT[APTT}    Current Inpatient Medications  Current Facility-Administered Medications: senna (SENOKOT) tablet 8.6 mg, 1 tablet, Oral, Nightly  polyethylene glycol (GLYCOLAX) packet 17 g, 17 g, Oral, Daily  ticagrelor (BRILINTA) tablet 90 mg, 90 mg, Oral, BID  aspirin EC tablet 81 mg, 81 mg, Oral, Daily  amLODIPine (NORVASC) tablet 5 mg, 5 mg, Oral, Daily  losartan (COZAAR) tablet 50 mg, 50 mg, Oral, Daily  ipratropium (ATROVENT) 0.02 % nebulizer solution 0.5 mg, 0.5 mg, Nebulization, 4x daily  arformoterol tartrate (BROVANA) nebulizer solution 15 mcg,
Department of Neurosurgery  Progress Note    CHIEF COMPLAINT: SAH    SUBJECTIVE:  No acute events overnight. No new complaints, double vision better. He was started on Brilinta and ASA yesterday. REVIEW OF SYSTEMS :  Constitutional: Negative for chills and fever. Neurological: Negative for dizziness, tremors and speech change.      OBJECTIVE:   VITALS:  /68   Pulse 54   Temp 98.5 °F (36.9 °C) (Temporal)   Resp 19   Ht 5' 6\" (1.676 m)   Wt 181 lb (82.1 kg)   SpO2 91%   BMI 29.21 kg/m²     PHYSICAL:  Alert, oriented  Appears stated age  PERRL  EOMI  Strength full  Sensation intact to light touch  (-)drift    DATA:  CBC:   Lab Results   Component Value Date/Time    WBC 4.6 05/15/2023 05:10 AM    RBC 4.27 05/15/2023 05:10 AM    HGB 12.8 05/15/2023 05:10 AM    HCT 38.1 05/15/2023 05:10 AM    MCV 89.2 05/15/2023 05:10 AM    MCH 30.0 05/15/2023 05:10 AM    MCHC 33.6 05/15/2023 05:10 AM    RDW 12.1 05/15/2023 05:10 AM     05/15/2023 05:10 AM    MPV 10.6 05/15/2023 05:10 AM     BMP:    Lab Results   Component Value Date/Time     05/15/2023 05:10 AM    K 3.8 05/15/2023 05:10 AM     05/15/2023 05:10 AM    CO2 24 05/15/2023 05:10 AM    BUN 14 05/15/2023 05:10 AM    LABALBU 3.5 05/14/2023 04:10 AM    CREATININE 1.1 05/15/2023 05:10 AM    CALCIUM 8.6 05/15/2023 05:10 AM    LABGLOM >60 05/15/2023 05:10 AM    GLUCOSE 104 05/15/2023 05:10 AM     PT/INR:  No results found for: PROTIME, INR  PTT:  No results found for: APTT, PTT[APTT}    Current Inpatient Medications  Current Facility-Administered Medications: ticagrelor (BRILINTA) tablet 90 mg, 90 mg, Oral, BID  aspirin EC tablet 81 mg, 81 mg, Oral, Daily  amLODIPine (NORVASC) tablet 5 mg, 5 mg, Oral, Daily  losartan (COZAAR) tablet 50 mg, 50 mg, Oral, Daily  ipratropium (ATROVENT) 0.02 % nebulizer solution 0.5 mg, 0.5 mg, Nebulization, 4x daily  arformoterol tartrate (BROVANA) nebulizer solution 15 mcg, 15 mcg, Nebulization, BID  atorvastatin
Hospitalist Progress Note      SYNOPSIS: Patient admitted on 2023 for Subarachnoid hemorrhage [facility])      SUBJECTIVE:    Patient seen and examined. He does mention his vision is much better today. Compared to previous he does not have to double vision on the right side is able to see more clearly compared to previous. Records reviewed. 68-year-old male presented from [facility] due to concern for subarachnoid hemorrhage. Patient is status post heart cath area today with PCI and placement of stents x2. Post catheterization he developed diplopia on his right eye seeing double given these stroke alert was called. CT angiogram head and neck was negative for any acute findings. CT brain concerning for possible bleed. Brain bleed was confirmed with MRI of the brain which showed subtotal hyperintense FLAIR signal within the left posterior temporal also Gilberto Flash concerning for subarachnoid hemorrhage. Of note he was also given plantar aspirin. Heart catheterization and stent placement. Initially was on heparin before the procedure. SBP goal less than 140. Repeat CT head with decreased density of left subarachnoid hemorrhage with no new intracranial hemorrhage identified. Given concern for diplopia and possible CVA neurology is currently following repeat MRI has been ordered. Cardiology also following given recent PCI with stents x2 to RCA. Cardio recommended resuming antiplatelets with Brilinta aspirin once cleared by neurosurgery. Patient was cleared to resume antiplatelet by neurosurgery. Repeat MRI of the brain was obtained given concern for his diplopia and stroke which came back positive for acute CVA. Stable overnight. No other overnight issues reported. Temp (24hrs), Av.2 °F (36.8 °C), Min:97.9 °F (36.6 °C), Max:98.5 °F (36.9 °C)    DIET: ADULT DIET;  Regular; 3 carb choices (45 gm/meal)  CODE: Full Code    Intake/Output Summary (Last 24 hours) at 5/15/2023
Neuro Science Intensive Care Unit  Critical Care  Daily Progress Note 5/15/2023    Date of Admission: 05/12/2023    CC: Follow up for [facility]. HOSPITAL COURSE/OVERNIGHT EVENTS:    5/13: From OSH for [facility] after heart cath and 2 stent placement. 5/14: No acute events, started on DAPT this AM, for MRI  5/14  No issues overnight. Did not require any prn antihypertensive agents. Denies blurred/double vision. PHYSICAL EXAM:   /68   Pulse 54   Temp 98.5 °F (36.9 °C) (Temporal)   Resp 19   Ht 5' 6\" (1.676 m)   Wt 181 lb (82.1 kg)   SpO2 91%   BMI 29.21 kg/m²     Intake/Output Summary (Last 24 hours) at 5/15/2023 0714  Last data filed at 5/15/2023 0500  Gross per 24 hour   Intake 960 ml   Output 2775 ml   Net -1815 ml      General appearance:  Comfortable. Pain Description: none. States he had a modeate to severe headache but no further headache. NEUROLOGIC:   RASS Score:  0  GCS:  15  4 - Opens eyes on own   6 - Follows simple motor commands  5 - Alert and oriented       Pupil size:  Left 3 mm  Right 3 mm  Pupil reaction: Yes   PERRLA  Wiggles fingers: Left   Yes  Right Yes  Hand grasp:   Left: Yes     Right    Yes  Wiggles toes: Left   Yes Right  Yes  Plantar flexion: Left  Yes  Right   Yes  Facial droop:   none   Speech:  clear    CONSTITUTIONAL: No acute distress, lying in hospital bed. CARDIOVASCULAR: S1 S2, regular rate, regular rhythm, no murmur/gallop/rub. Monitor: NSR  PULMONARY: Bilaterally clear. No rhonchi/rales/wheezes, no use of accessory muscles. Room air. RENAL: Voids        Fluid balance for previous 24 hours:  - 1.8 L. ABDOMEN: Soft, nontender, nondistended, nontympanic, normal bowel sounds. Diet:  Regular Low fat/low cholesterol/high fiber/2 G sodium and 3 gram carbohydrate. No reported nausea or vomiting. Last BM:  PTA  MUSCULOSKELETAL:  Moves all extremities purposefully. No weakness noted.     SKIN/EXTREMITIES: No rashes/ecchymosis, no edema/clubbing,
Patient to CT and back to room with RN.
Physical Therapy    Physical Therapy Initial Assessment     Name: Devan Aldana  : 1960  MRN: [id number]      Date of Service: 5/15/2023    Evaluating PT:  Armida Lott PT, DPT  [id number]     Room #:  3473/6305-V  Diagnosis:  Non-traumatic intracranial subarachnoid hemorrhage ([facility] [address] [I60.7]  Subarachnoid hemorrhage ([facility] [address] [I60.9]  PMHx/PSHx:   has no past medical history on file. Procedure/Surgery:  None   Precautions:  Falls, SBP<140, R eye diplopia   Equipment Needs:  NA    SUBJECTIVE:    Pt lives with his wife in a 2 story home with 3 stairs and B rail to enter. Bedroom and bathroom are on the 2nd level with full flight and B rails. Pt ambulated with no AD, independent, drives/works PTA. OBJECTIVE:   Initial Evaluation  Date: 5/15/23 Treatment Short Term/ Long Term   Goals   AM-PAC 6 Clicks      Was pt agreeable to Eval/treatment? Yes      Does pt have pain? No c/o pain      Bed Mobility  Rolling: SBA  Supine to sit: SBA  Sit to supine: NT  Scooting: SBA  Rolling: Independent   Supine to sit: Independent   Sit to supine: Independent   Scooting: Independent    Transfers Sit to stand: CGA  Stand to sit: CGA  Stand pivot: CGA  Sit to stand: Independent   Stand to sit:  Independent   Stand pivot: Independent    Ambulation    400 feet with no AD CGA  >500 feet with no AD Independent    Stair negotiation: ascended and descended  NT  >12 steps with 1 rail Modified Independent     ROM BUE:  Per OT eval  BLE:  WFL     Strength BUE:  Per OT eval   BLE:  5/5     Balance Sitting EOB:  SBA  Dynamic Standing:  CGA  Sitting EOB:  Independent   Dynamic Standing:  Independent      Pt is A & O x 4  RASS:  0  CAM-ICU:  NT  Sensation:  Pt denies numbness and tingling to extremities  Edema:  Unremarkable    Vitals:  Blood Pressure at rest 119/66 mmHg  Blood Pressure post session 146/71 mmHg   Heart Rate at rest 102 bpm  Heart Rate post session 70 bpm    SPO2 at rest 98% on RA SPO2 post session 99% on RA
Physician Progress Note      PATIENT:               Ashok Covarrubias  CSN #:                  [id number]  :                       1960  ADMIT DATE:       2023 5:42 PM  DISCH DATE:  RESPONDING  PROVIDER #:        CAMERON STARKS          QUERY TEXT:    Pt admitted with [facility] and is s/p heart cath with PCI and placement of 2 stents   at outside facility prior to transfer on 2023, noted documentation of   ***.? If possible, please document in progress notes and discharge summary the   relationship if any between the *** and the surgery: The medical record reflects the following:  Risk Factors: CAD s/p PCI w stent x 2, HTN, Diplopia  Clinical Indicators:  Neurosurgery consult- transfer from [facility]   for questionable subdural arachnoid hemorrhage after cardiac cath and 2 stents   placed in the RCA. Post-procedure apparently developed double vision. Patient endorses that his diplopia is horizontal in nature and prominent when   looking to the right. Heparin was reversed with protamine. CT scan of the head   which demonstrates left parietal and occipital subarachnoid hyperalert   density. There is no mass effect or midline shift. It is diminished on   repeat evaluation.  Cardiology consult- He unde  Treatment: Serial Neuro Checks, Blood pressure control; keep SBP <140, CT,   MRI, consults, labs, monitoring, reversal of heparin with protamine    Thank you,  Alin Whitten RN, CRCR  Clinical DOcumentaiton Improvement  Options provided:  -- SAH is due to the procedure  -- SAH is not due to the procedure, but is possibly related to Heparin   administration at outside facility  -- [facility] is not due to the procedure, but is due to other incidental risk   factor, Please specify other incidental risk factor.   -- Other - I will add my own diagnosis  -- Disagree - Not applicable / Not valid  -- Disagree - Clinically unable to determine / Unknown  -- Refer to Clinical Documentation
Report called to 8. Transport Requested. The following patient belongings:  Glasses, Cell Phone, Cell Phone , Personal Electronic Device, Pants, Shirt, Socks, and Shoes were gathered and placed with patient on transfer upon [place] [facility] Family notified by patient, he called and texted his wife his new room number ans that he was moving shortly.   
SPEECH/LANGUAGE PATHOLOGY  SPEECH/LANGUAGE/COGNITIVE EVALUATION   and PLAN OF CARE      PATIENT NAME:  Daksha Kelly  (male)     MRN:  [id number]    :  1960  (70 y.o.)  STATUS:  Inpatient: Room 4522/4522-A    TODAY'S DATE:  5/15/2023  05/15/23 0815    SLP eval and treat  Start:  05/15/23 0815,   End:  05/15/23 0815,   ONE TIME,   Standing Count:  1 Occurrences,   JAMIL Lee APRN - CNS   REASON FOR REFERRAL: Assess speech/language/cognitive functions    EVALUATING THERAPIST: Meliza Nichols, OLIVER    ADMITTING DIAGNOSIS: Non-traumatic intracranial subarachnoid hemorrhage ([facility] [address] [I60.7]  Subarachnoid hemorrhage ([facility] [address] [I60.9]    VISIT DIAGNOSIS:      SPEECH THERAPY  PLAN OF CARE   The speech therapy  POC is established based on physician order, speech pathology diagnosis and results of clinical assessment     SPEECH PATHOLOGY DIAGNOSIS:    Within function limits    Speech Pathology intervention is not warranted at this time. Conditions Requiring Skilled Therapeutic Intervention for speech, language and/or cognition  Not applicable     Specific Speech Therapy Interventions to Include:   Not applicable    Specific instructions for next treatment:    Not applicable    SHORT/LONG TERM GOALS  Not applicable      Patient goals: Patient/family involved in developing goals and treatment plan:   Treatment goals discussed with Patient    The Patient understand(s) the diagnosis, prognosis and plan of care   The patient/family Agreed with above,     This plan may be re-evaluated and revised as warranted. Rehabilitation Potential/Prognosis: not applicable                CLINICAL ASSESSMENT:  MOTOR SPEECH       Oral Peripheral Examination   Adequate lingual/labial strength     Parameters of Speech Production  Respiration:  Adequate for speech production  Articulation:  Within functional limits  Resonance:  Within functional limits  Quality:   Within functional limits  Pitch:     Within functional
05/15/2023 05:10 AM    PROT 6.1 05/14/2023 04:10 AM    LABALBU 3.5 05/14/2023 04:10 AM    CALCIUM 8.6 05/15/2023 05:10 AM    BILITOT 0.3 05/14/2023 04:10 AM    ALKPHOS 88 05/14/2023 04:10 AM    AST 14 05/14/2023 04:10 AM    ALT 12 05/14/2023 04:10 AM     HgBA1c:    Lab Results   Component Value Date/Time    LABA1C 5.4 05/14/2023 04:10 AM     TSH/T4 normal     CT Head:  Stable left occipital and temporal subarachnoid hemorrhage. No significant  mass effect or midline shift. MRI Brain - in PACS from [facility]  No acute changes in brainstem     CTAs in PACS from [facility] - report in soft chart  No aneurysm identified     MRI brain  IMPRESSION:  1. Punctate acute infarct involving the left dorsal leesa. Punctate acute  infarcts also seen in the right cerebellum and left occipital lobe. No mass  effect or midline shift. 2. Susceptibility along the sulci of the left posterior temporal/occipital  lobe compatible with sequelae of the known recent subarachnoid hemorrhage. 3. No acute intracranial abnormality otherwise seen. 4. Mild chronic microvascular ischemic changes            I independently reviewed the labs and imaging studies today.          MERLINE Bardales CNP  11:41 AM  5/15/2023
[id number]     Non-Billable Time        Evaluation time includes thorough review of current medical information, gathering information on past medical history/social history and prior level of function, completion of standardized testing/informal observation of tasks, assessment of data and development of POC/Goals.      Celina Mcgarry, OTR/L [id number]

## 2023-05-26 ENCOUNTER — TELEPHONE (OUTPATIENT)
Dept: ADMINISTRATIVE | Age: 63
End: 2023-05-26